# Patient Record
Sex: FEMALE | Race: WHITE | NOT HISPANIC OR LATINO | Employment: OTHER | URBAN - METROPOLITAN AREA
[De-identification: names, ages, dates, MRNs, and addresses within clinical notes are randomized per-mention and may not be internally consistent; named-entity substitution may affect disease eponyms.]

---

## 2023-08-31 LAB
IGA (MG/DL) IN SER/PLAS: 78 MG/DL (ref 70–400)
IGG (MG/DL) IN SER/PLAS: 630 MG/DL (ref 700–1600)
IGM (MG/DL) IN SER/PLAS: 68 MG/DL (ref 40–230)

## 2023-09-06 LAB
PNEUMO SEROTYPE INTERPRETATION: NORMAL
SEROTYPE 1, VIRC: 4.74 UG/ML
SEROTYPE 10A(34), VIRC: 3.56 UG/ML
SEROTYPE 11A(43), VIRC: 2.43 UG/ML
SEROTYPE 12F, VIRC: 7.48 UG/ML
SEROTYPE 14, VIRC: 0.86 UG/ML
SEROTYPE 15B(54), VIRC: 5.03 UG/ML
SEROTYPE 17F, VIRC: 6.43 UG/ML
SEROTYPE 18C(56), VIRC: 4.43 UG/ML
SEROTYPE 19A(57), VIRC: 20.64 UG/ML
SEROTYPE 19F, VIRC: 9.77 UG/ML
SEROTYPE 2, VIRC: 2.85 UG/ML
SEROTYPE 20, VIRC: 0.96 UG/ML
SEROTYPE 22F, VIRC: 2.35 UG/ML
SEROTYPE 23F, VIRC: 0.46 UG/ML
SEROTYPE 3, VIRC: 0.98 UG/ML
SEROTYPE 33F(70), VIRC: 3.28 UG/ML
SEROTYPE 4, VIRC: 3.69 UG/ML
SEROTYPE 5, VIRC: 6.18 UG/ML
SEROTYPE 6B(26), VIRC: 1.89 UG/ML
SEROTYPE 7F(51), VIRC: 0.66 UG/ML
SEROTYPE 8, VIRC: 0.84 UG/ML
SEROTYPE 9N, VIRC: 5.3 UG/ML
SEROTYPE 9V(68), VIRC: 4.36 UG/ML

## 2023-11-06 DIAGNOSIS — J01.91 ACUTE RECURRENT SINUSITIS, UNSPECIFIED LOCATION: Primary | ICD-10-CM

## 2023-11-06 RX ORDER — CEFDINIR 300 MG/1
300 CAPSULE ORAL 2 TIMES DAILY
Qty: 20 CAPSULE | Refills: 0 | Status: SHIPPED | OUTPATIENT
Start: 2023-11-06 | End: 2023-11-16

## 2023-11-06 NOTE — PROGRESS NOTES
Julianna reached out today at  7:46?AM   She wrote:   Symptoms xs 2 1/2 days:  horrible headache/nausea/dizziness/congestion.  No SOB or fever.  Negative results for Covid and RSV on 11/5 at [facility]. This sure feels like my c/o sinus. Nothing prescribed.  Thanks, Julianna Meadows  [birth year]    I had replied:   understood  I'll send an antibiotic prescription  which pharmacy would you like  ?    Julianna Meadows :  [facility] [place].  Thanks, Julianna Medina MD :  Let me know if you are not feeling better in 3-5 days.  Also, pause the amoxicillin prophylaxis until the acute antibiotic is completed.       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 Given the normal RSV and COVID testing, I am going to treat her for an acute sinusitis.

## 2024-01-23 DIAGNOSIS — U07.1 COVID: Primary | ICD-10-CM

## 2024-01-23 RX ORDER — NIRMATRELVIR AND RITONAVIR 300-100 MG
3 KIT ORAL 2 TIMES DAILY
Qty: 30 TABLET | Refills: 0 | Status: SHIPPED | OUTPATIENT
Start: 2024-01-23 | End: 2024-01-28

## 2024-01-23 NOTE — PROGRESS NOTES
01/23/24 2:54 PM    Julianna Meadows   I'm pretty positive I've developed a sinus infection.  Headache + stuffiness since sometime Fri.  Since yesterday I'm very dizzy and nauseous. My headache and congestion much worse.  I do not have fever or SOB.  Using my nasal spray and manage w tonic for nausea.  Thanks, Julianna Meadows([phone])    Alexandra Medina MD    11:27?AM   do you have a covid test at home?    Julianna   Doing it now    Alexandra Medina MD   Great     Julianna Meadows   I have Covid     Alexnadra Medina MD   Understood.  I am going to prescribe you Paxlovid to treat COVID.  However, I need to make sure that it does not adverse intereact with your other medicines.   Please, send me a list of all the meds you are on.  If you start having difficutly breathing, I am going to recommend a trip to emergency room.  I'll call in the Paxlovid Rx once I confirm it's OK for you to take it.    Julianna Meadows   Pravastatin 20mg/1xqd/asa 81mg/triamteren-E-HCTZ 37.5-25 MG CP/amoxicillin 500mg 2xs daily    Alexandra Medina MD   Got it.  Checking    1:31?PM     Julianna Meadows   I will not take any of my OTC meds!   2:51?PM     Alexandra Medina MD   looks like there is no major interactions  I'll send the Rx soon

## 2024-01-24 ENCOUNTER — APPOINTMENT (OUTPATIENT)
Dept: SURGERY | Facility: HOSPITAL | Age: 84
End: 2024-01-24
Payer: MEDICARE

## 2024-01-24 ENCOUNTER — APPOINTMENT (OUTPATIENT)
Dept: RADIOLOGY | Facility: HOSPITAL | Age: 84
End: 2024-01-24
Payer: MEDICARE

## 2024-01-31 ENCOUNTER — HOSPITAL ENCOUNTER (OUTPATIENT)
Dept: RADIOLOGY | Facility: CLINIC | Age: 84
Discharge: HOME | End: 2024-01-31
Payer: MEDICARE

## 2024-01-31 ENCOUNTER — APPOINTMENT (OUTPATIENT)
Dept: RADIOLOGY | Facility: HOSPITAL | Age: 84
End: 2024-01-31
Payer: MEDICARE

## 2024-01-31 DIAGNOSIS — C34.90 MALIGNANT NEOPLASM OF UNSPECIFIED PART OF UNSPECIFIED BRONCHUS OR LUNG (MULTI): ICD-10-CM

## 2024-01-31 PROCEDURE — 71250 CT THORAX DX C-: CPT

## 2024-01-31 PROCEDURE — 71250 CT THORAX DX C-: CPT | Performed by: RADIOLOGY

## 2024-02-07 ENCOUNTER — OFFICE VISIT (OUTPATIENT)
Dept: SURGERY | Facility: HOSPITAL | Age: 84
End: 2024-02-07
Payer: MEDICARE

## 2024-02-07 VITALS
HEART RATE: 74 BPM | TEMPERATURE: 97.3 F | BODY MASS INDEX: 24.17 KG/M2 | WEIGHT: 136.4 LBS | RESPIRATION RATE: 18 BRPM | OXYGEN SATURATION: 96 % | DIASTOLIC BLOOD PRESSURE: 66 MMHG | HEIGHT: 63 IN | SYSTOLIC BLOOD PRESSURE: 127 MMHG

## 2024-02-07 DIAGNOSIS — C34.12 MALIGNANT NEOPLASM OF UPPER LOBE OF LEFT LUNG (MULTI): ICD-10-CM

## 2024-02-07 PROCEDURE — 99214 OFFICE O/P EST MOD 30 MIN: CPT | Performed by: THORACIC SURGERY (CARDIOTHORACIC VASCULAR SURGERY)

## 2024-02-07 PROCEDURE — 1125F AMNT PAIN NOTED PAIN PRSNT: CPT | Performed by: THORACIC SURGERY (CARDIOTHORACIC VASCULAR SURGERY)

## 2024-02-07 PROCEDURE — 1159F MED LIST DOCD IN RCRD: CPT | Performed by: THORACIC SURGERY (CARDIOTHORACIC VASCULAR SURGERY)

## 2024-02-07 PROCEDURE — 1036F TOBACCO NON-USER: CPT | Performed by: THORACIC SURGERY (CARDIOTHORACIC VASCULAR SURGERY)

## 2024-02-07 PROCEDURE — 1157F ADVNC CARE PLAN IN RCRD: CPT | Performed by: THORACIC SURGERY (CARDIOTHORACIC VASCULAR SURGERY)

## 2024-02-07 RX ORDER — PRAVASTATIN SODIUM 20 MG/1
20 TABLET ORAL DAILY
COMMUNITY

## 2024-02-07 RX ORDER — MAGNESIUM 200 MG
TABLET ORAL
COMMUNITY

## 2024-02-07 RX ORDER — ASPIRIN 81 MG/1
81 TABLET ORAL DAILY
COMMUNITY

## 2024-02-07 RX ORDER — TRIAMTERENE/HYDROCHLOROTHIAZID 37.5-25 MG
1 TABLET ORAL DAILY
COMMUNITY

## 2024-02-07 ASSESSMENT — PATIENT HEALTH QUESTIONNAIRE - PHQ9
SUM OF ALL RESPONSES TO PHQ9 QUESTIONS 1 AND 2: 0
2. FEELING DOWN, DEPRESSED OR HOPELESS: NOT AT ALL
1. LITTLE INTEREST OR PLEASURE IN DOING THINGS: NOT AT ALL

## 2024-02-07 ASSESSMENT — COLUMBIA-SUICIDE SEVERITY RATING SCALE - C-SSRS
2. HAVE YOU ACTUALLY HAD ANY THOUGHTS OF KILLING YOURSELF?: NO
6. HAVE YOU EVER DONE ANYTHING, STARTED TO DO ANYTHING, OR PREPARED TO DO ANYTHING TO END YOUR LIFE?: NO
1. IN THE PAST MONTH, HAVE YOU WISHED YOU WERE DEAD OR WISHED YOU COULD GO TO SLEEP AND NOT WAKE UP?: NO

## 2024-02-07 ASSESSMENT — ENCOUNTER SYMPTOMS
LOSS OF SENSATION IN FEET: 0
OCCASIONAL FEELINGS OF UNSTEADINESS: 0
DEPRESSION: 0

## 2024-02-07 ASSESSMENT — PAIN SCALES - GENERAL: PAINLEVEL: 3

## 2024-02-07 NOTE — PROGRESS NOTES
Mrs. 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 She has COPD treated with inhalers. Since the last visit there have been no changes to the past medical history, past surgical history, social history, family history, or review of systems.     I looked at her CAT scan from January 31, 2024 and compared to the prior.  There is no evidence of recurrence.    Meera is doing well and is without evidence of recurrence.  I will see her back in 6 months with a CAT scan.

## 2024-05-15 ENCOUNTER — HOSPITAL ENCOUNTER (OUTPATIENT)
Dept: RADIOLOGY | Facility: CLINIC | Age: 84
Discharge: HOME | End: 2024-05-15
Payer: MEDICARE

## 2024-05-15 ENCOUNTER — OFFICE VISIT (OUTPATIENT)
Dept: ORTHOPEDIC SURGERY | Facility: CLINIC | Age: 84
End: 2024-05-15
Payer: MEDICARE

## 2024-05-15 VITALS — WEIGHT: 136 LBS | HEIGHT: 62 IN | BODY MASS INDEX: 25.03 KG/M2

## 2024-05-15 DIAGNOSIS — M25.551 RIGHT HIP PAIN: ICD-10-CM

## 2024-05-15 DIAGNOSIS — M70.61 TROCHANTERIC BURSITIS OF RIGHT HIP: ICD-10-CM

## 2024-05-15 DIAGNOSIS — M25.551 RIGHT HIP PAIN: Primary | ICD-10-CM

## 2024-05-15 PROCEDURE — 1159F MED LIST DOCD IN RCRD: CPT | Performed by: ORTHOPAEDIC SURGERY

## 2024-05-15 PROCEDURE — 99213 OFFICE O/P EST LOW 20 MIN: CPT | Performed by: ORTHOPAEDIC SURGERY

## 2024-05-15 PROCEDURE — 73502 X-RAY EXAM HIP UNI 2-3 VIEWS: CPT | Mod: RT

## 2024-05-15 PROCEDURE — 1157F ADVNC CARE PLAN IN RCRD: CPT | Performed by: ORTHOPAEDIC SURGERY

## 2024-05-15 PROCEDURE — 1125F AMNT PAIN NOTED PAIN PRSNT: CPT | Performed by: ORTHOPAEDIC SURGERY

## 2024-05-15 PROCEDURE — 73502 X-RAY EXAM HIP UNI 2-3 VIEWS: CPT | Mod: RIGHT SIDE | Performed by: RADIOLOGY

## 2024-05-15 ASSESSMENT — PAIN DESCRIPTION - DESCRIPTORS: DESCRIPTORS: DISCOMFORT;THROBBING

## 2024-05-15 ASSESSMENT — PAIN SCALES - GENERAL: PAINLEVEL_OUTOF10: 2

## 2024-05-15 ASSESSMENT — PAIN - FUNCTIONAL ASSESSMENT: PAIN_FUNCTIONAL_ASSESSMENT: 0-10

## 2024-06-07 ENCOUNTER — DOCUMENTATION (OUTPATIENT)
Dept: ALLERGY | Facility: CLINIC | Age: 84
End: 2024-06-07
Payer: MEDICARE

## 2024-06-07 DIAGNOSIS — J01.91 ACUTE RECURRENT SINUSITIS, UNSPECIFIED LOCATION: ICD-10-CM

## 2024-06-07 DIAGNOSIS — J01.91 ACUTE RECURRENT SINUSITIS, UNSPECIFIED LOCATION: Primary | ICD-10-CM

## 2024-06-07 RX ORDER — CEFDINIR 300 MG/1
300 CAPSULE ORAL 2 TIMES DAILY
Qty: 20 CAPSULE | Refills: 0 | Status: SHIPPED | OUTPATIENT
Start: 2024-06-07 | End: 2024-06-07 | Stop reason: SDUPTHER

## 2024-06-07 RX ORDER — CEFDINIR 300 MG/1
300 CAPSULE ORAL 2 TIMES DAILY
Qty: 20 CAPSULE | Refills: 0 | Status: SHIPPED | OUTPATIENT
Start: 2024-06-07 | End: 2024-06-17

## 2024-06-07 NOTE — PROGRESS NOTES
"Julianna reached out to me today::    \"Hi!  I tested negative for Covid.  Have had: congestion w huge headache/some nausea. Dizziness also. No fever.  Been feeling symptoms about 4 days.  Thanks, Julianna Meadows/ [birth year]\"      "

## 2024-06-08 DIAGNOSIS — J32.9 SINUSITIS, UNSPECIFIED CHRONICITY, UNSPECIFIED LOCATION: Primary | ICD-10-CM

## 2024-06-17 RX ORDER — AMOXICILLIN 500 MG/1
500 TABLET, FILM COATED ORAL EVERY 12 HOURS
Qty: 60 TABLET | Refills: 5 | Status: SHIPPED | OUTPATIENT
Start: 2024-06-17

## 2024-06-24 NOTE — PROGRESS NOTES
Patient presents today for right laterally based hip pain.  She denies any groin pain.  It comes and goes.  She is never had surgery in the hip.    Right hip:  AAOx3, NAD, walks with a non-antalgic gait  No pain with flexion internal rotation  Negative Stinchfield  Mild TTP over trochanter laterally  5/5 Hip flexion/knee extension/df/pf/ehl  SILT in a chriss/saph/per/tib distribution  ½ dorsalis pedis and posterior tibial pulse  no popliteal or inguinal lymphadenopathy  no other overlying lesions  mood: euthymic  Respirations non labored  Incision healed    Plain films were reviewed by myself in clinic today.  She has well-maintained joint space with no signs of intra-articular pathology.    Patient with mildly symptomatic trochanteric bursitis.  We discussed conservative treatment including anti-inflammatories, Tylenol and possible injections.  She would like to see how it does over time.  All of her questions were answered.  She will follow-up with us as needed.    This note was created using voice recognition software and was not corrected for typographical or grammatical errors.

## 2024-08-07 ENCOUNTER — APPOINTMENT (OUTPATIENT)
Dept: SURGERY | Facility: HOSPITAL | Age: 84
End: 2024-08-07
Payer: MEDICARE

## 2024-08-07 ENCOUNTER — APPOINTMENT (OUTPATIENT)
Dept: RADIOLOGY | Facility: CLINIC | Age: 84
End: 2024-08-07
Payer: MEDICARE

## 2024-08-14 ENCOUNTER — OFFICE VISIT (OUTPATIENT)
Dept: SURGERY | Facility: HOSPITAL | Age: 84
End: 2024-08-14
Payer: MEDICARE

## 2024-08-14 ENCOUNTER — HOSPITAL ENCOUNTER (OUTPATIENT)
Dept: RADIOLOGY | Facility: HOSPITAL | Age: 84
Discharge: HOME | End: 2024-08-14
Payer: MEDICARE

## 2024-08-14 VITALS
OXYGEN SATURATION: 97 % | RESPIRATION RATE: 18 BRPM | WEIGHT: 137.79 LBS | DIASTOLIC BLOOD PRESSURE: 63 MMHG | HEART RATE: 70 BPM | TEMPERATURE: 97.5 F | SYSTOLIC BLOOD PRESSURE: 122 MMHG | BODY MASS INDEX: 25.2 KG/M2

## 2024-08-14 DIAGNOSIS — C34.90 MALIGNANT NEOPLASM OF LUNG, UNSPECIFIED LATERALITY, UNSPECIFIED PART OF LUNG (MULTI): ICD-10-CM

## 2024-08-14 DIAGNOSIS — C34.12 MALIGNANT NEOPLASM OF UPPER LOBE OF LEFT LUNG (MULTI): ICD-10-CM

## 2024-08-14 PROCEDURE — 71250 CT THORAX DX C-: CPT | Performed by: RADIOLOGY

## 2024-08-14 PROCEDURE — 1157F ADVNC CARE PLAN IN RCRD: CPT | Performed by: THORACIC SURGERY (CARDIOTHORACIC VASCULAR SURGERY)

## 2024-08-14 PROCEDURE — 1159F MED LIST DOCD IN RCRD: CPT | Performed by: THORACIC SURGERY (CARDIOTHORACIC VASCULAR SURGERY)

## 2024-08-14 PROCEDURE — 71250 CT THORAX DX C-: CPT

## 2024-08-14 PROCEDURE — 99214 OFFICE O/P EST MOD 30 MIN: CPT | Performed by: THORACIC SURGERY (CARDIOTHORACIC VASCULAR SURGERY)

## 2024-08-14 PROCEDURE — 1126F AMNT PAIN NOTED NONE PRSNT: CPT | Performed by: THORACIC SURGERY (CARDIOTHORACIC VASCULAR SURGERY)

## 2024-08-14 ASSESSMENT — PAIN SCALES - GENERAL: PAINLEVEL: 0-NO PAIN

## 2024-08-14 NOTE — PROGRESS NOTES
Mrs. Meadows underwent a left VATS superior segmentectomy on 7/5/22 for a 1.8cm adenocarcinoma stage [id number] with all margins negative. She has COPD treated with inhalers. Since the last visit there have been no changes to the past medical history, past surgical history, social history, family history, or review of systems.     I viewed the current CT scan and compared it to the prior.  There are no new lung nodules or adenopathy.  There is no evidence of recurrence.  Mrs. García is doing well.  We will check another CAT scan in 1 year.

## 2024-08-30 DIAGNOSIS — D80.1 HYPOGAMMAGLOBULINEMIA (MULTI): Primary | ICD-10-CM

## 2024-08-30 NOTE — PROGRESS NOTES
Julianna:   8:40?AM   Hi! Do I need blood work before I restart Amoxicillin bid.  Normally I start again in Sept.  Julianna Meadows(1940)  [phone]    Alexandra Medina MD    12:27?PM   Yes, I'd recommend to recheck the blood work again - you may start Amoxicillin before the blood work is done, however.  No need to fast for these blood tests either.   Please, make a follow up visit in a month or two for a chec up.  The blood test orders are in the  system - so you could go to any  affiliated lab to have them drawn.  Do you need any refills for Amoxicillin?

## 2024-09-03 ENCOUNTER — LAB (OUTPATIENT)
Dept: LAB | Facility: LAB | Age: 84
End: 2024-09-03
Payer: MEDICARE

## 2024-09-03 DIAGNOSIS — D80.1 HYPOGAMMAGLOBULINEMIA (MULTI): ICD-10-CM

## 2024-09-03 PROCEDURE — 82784 ASSAY IGA/IGD/IGG/IGM EACH: CPT

## 2024-09-03 PROCEDURE — 36415 COLL VENOUS BLD VENIPUNCTURE: CPT

## 2024-09-04 LAB
IGA SERPL-MCNC: 110 MG/DL (ref 70–400)
IGG SERPL-MCNC: 777 MG/DL (ref 700–1600)
IGM SERPL-MCNC: 72 MG/DL (ref 40–230)

## 2024-11-04 ENCOUNTER — APPOINTMENT (OUTPATIENT)
Dept: ALLERGY | Facility: CLINIC | Age: 84
End: 2024-11-04
Payer: MEDICARE

## 2024-11-04 VITALS — OXYGEN SATURATION: 96 % | WEIGHT: 141.1 LBS | TEMPERATURE: 97.5 F | BODY MASS INDEX: 25.81 KG/M2 | HEART RATE: 69 BPM

## 2024-11-04 DIAGNOSIS — D80.1 HYPOGAMMAGLOBULINEMIA (MULTI): Primary | ICD-10-CM

## 2024-11-04 PROCEDURE — 99214 OFFICE O/P EST MOD 30 MIN: CPT | Performed by: PEDIATRICS

## 2024-11-04 PROCEDURE — 1157F ADVNC CARE PLAN IN RCRD: CPT | Performed by: PEDIATRICS

## 2024-11-04 ASSESSMENT — ENCOUNTER SYMPTOMS
PALPITATIONS: 0
VOMITING: 0
ABDOMINAL PAIN: 0
JOINT SWELLING: 0
COUGH: 0
ADENOPATHY: 0
FACIAL SWELLING: 0
BLOOD IN STOOL: 0
EYE DISCHARGE: 0
FEVER: 0
EYE ITCHING: 0
RHINORRHEA: 0
NAUSEA: 0
UNEXPECTED WEIGHT CHANGE: 0
DIARRHEA: 0

## 2024-11-04 NOTE — PROGRESS NOTES
Patient ID: Julianna Meadows is a 84 y.o. female who [facility] for a follow up visit.    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    She has restarted amoxicillin prophylaxis in September.     Immune prophylaxis: She takes amoxicillin 500 mg BID in the fall and winter season  Side effects: n/a     Recent Results (from the past 20 weeks)   IgA    Collection Time: 09/03/24 11:57 AM   Result Value Ref Range    IgA 110 70 - 400 mg/dL   IgG    Collection Time: 09/03/24 11:57 AM   Result Value Ref Range    IgG 777 700 - 1,600 mg/dL   IgM    Collection Time: 09/03/24 11:57 AM   Result Value Ref Range    IgM 72 40 - 230 mg/dL       IgG is now normal.  This is great--a big improvement compared to last year.    Review of Systems   Constitutional:  Negative for fever and unexpected weight change (no poor weight gain).   HENT:  Negative for ear pain, facial swelling, postnasal drip, rhinorrhea and sneezing.    Eyes:  Negative for discharge and itching.   Respiratory:  Negative for cough.    Cardiovascular:  Negative for chest pain and palpitations.   Gastrointestinal:  Negative for abdominal pain, blood in stool, diarrhea, nausea and vomiting ((no dysphagia)).   Musculoskeletal:  Negative for joint swelling.        Hip pain - needs a hip replacement surgery, potentially.   Skin:  Negative for rash.   Neurological:  Negative for syncope.   Hematological:  Negative for adenopathy.   All other systems reviewed and are negative.       Visit Vitals  Pulse 69   Temp 36.4 °C (97.5 °F) (Oral)   Wt 64 kg (141 lb 1.6 oz)   SpO2 96% Comment: RA   BMI 25.81 kg/m²   Smoking Status Former   BSA 1.67 m²        CONSTITUTIONAL: Well developed, well nourished, no acute distress.   HEAD: Normocephalic, no dysmorphic features.   EYES: No Dennie Josh lines; no allergic shiners. Conjunctiva and  "sclerae are not injected.   EARS: Tympanic Membranes have normal landmarks without erythema   NOSE: the nasal mucosa is pink, nasal passages are patent, there is no discharge seen. No nasal polyps.  THROAT:  no oral lesion(s).   NECK: Normal, supple, symmetric, trachea midline.  LYMPH: No cervical lymphadenopathy or masses noted.    CARDIOVASCULAR: Regular rate, no murmur.    PULMONARY: Comfortable breathing pattern, no distress, normal aeration, clear to auscultation and no wheezing.   ABDOMEN: Soft non-tender, non-distended.   MUSCULOSKELETAL: no clubbing, cyanosis, or edema  SKIN:  no xerosis; no rash     Impressions:  -- Hypogammaglobulinemia with preserved pneumococcal responses.  In 2024 IgG level is 777 mg/dL.  IgM and IgA were normal as well.    -- History of recurrent sinusitis     (-- Chronic headaches- with history of a brain aneurysm)     Plan:   She had responded well to amoxicillin prophylaxis in the past. Let's continue amoxicillin prophylaxis (500 mg of AMOXICILLIN PO BID) in the upcoming fall and winter season (until April) -not withstanding her normal serum IgG levels.  In the past, her IgG levels have fluctuated as well between being normal and \"hypo\".  To be safe, lets just stay on the prophylaxis until April to.     Use Flonase as well at the first sign of a cold.     [facility]: In 12 months  "

## 2024-12-01 DIAGNOSIS — J32.9 SINUSITIS, UNSPECIFIED CHRONICITY, UNSPECIFIED LOCATION: ICD-10-CM

## 2024-12-03 RX ORDER — AMOXICILLIN 500 MG/1
500 TABLET, FILM COATED ORAL EVERY 12 HOURS
Qty: 60 TABLET | Refills: 5 | Status: SHIPPED | OUTPATIENT
Start: 2024-12-03

## 2025-03-12 ENCOUNTER — DOCUMENTATION (OUTPATIENT)
Dept: ALLERGY | Facility: CLINIC | Age: 85
End: 2025-03-12
Payer: MEDICARE

## 2025-03-12 DIAGNOSIS — J01.91 ACUTE RECURRENT SINUSITIS, UNSPECIFIED LOCATION: ICD-10-CM

## 2025-03-12 RX ORDER — DOXYCYCLINE 100 MG/1
100 CAPSULE ORAL 2 TIMES DAILY
Qty: 20 CAPSULE | Refills: 0 | Status: SHIPPED | OUTPATIENT
Start: 2025-03-12 | End: 2025-03-22

## 2025-03-12 NOTE — PROGRESS NOTES
Julianna texted:  I’m pretty sure I have sinus infection: slight dizziness and nausea.  Headache and stuffy/tight congestion.  No fever.  Earlier tested negative for Covid.  Julianna Meadows(1940)  Thanks    Duration of symptoms : 3 days    I: acute sinusitis   R: doxy x 10 days

## 2025-08-13 ENCOUNTER — HOSPITAL ENCOUNTER (OUTPATIENT)
Dept: RADIOLOGY | Facility: HOSPITAL | Age: 85
Discharge: HOME | End: 2025-08-13
Payer: MEDICARE

## 2025-08-13 ENCOUNTER — OFFICE VISIT (OUTPATIENT)
Dept: SURGERY | Facility: HOSPITAL | Age: 85
End: 2025-08-13
Payer: MEDICARE

## 2025-08-13 VITALS
DIASTOLIC BLOOD PRESSURE: 77 MMHG | WEIGHT: 136.2 LBS | RESPIRATION RATE: 16 BRPM | HEART RATE: 61 BPM | TEMPERATURE: 96.4 F | SYSTOLIC BLOOD PRESSURE: 149 MMHG | BODY MASS INDEX: 24.91 KG/M2 | OXYGEN SATURATION: 97 %

## 2025-08-13 DIAGNOSIS — C34.90 MALIGNANT NEOPLASM OF LUNG, UNSPECIFIED LATERALITY, UNSPECIFIED PART OF LUNG (MULTI): ICD-10-CM

## 2025-08-13 PROCEDURE — 1157F ADVNC CARE PLAN IN RCRD: CPT | Performed by: THORACIC SURGERY (CARDIOTHORACIC VASCULAR SURGERY)

## 2025-08-13 PROCEDURE — 71250 CT THORAX DX C-: CPT | Performed by: STUDENT IN AN ORGANIZED HEALTH CARE EDUCATION/TRAINING PROGRAM

## 2025-08-13 PROCEDURE — 71250 CT THORAX DX C-: CPT

## 2025-08-13 PROCEDURE — 99214 OFFICE O/P EST MOD 30 MIN: CPT | Performed by: THORACIC SURGERY (CARDIOTHORACIC VASCULAR SURGERY)

## 2025-08-13 PROCEDURE — 1126F AMNT PAIN NOTED NONE PRSNT: CPT | Performed by: THORACIC SURGERY (CARDIOTHORACIC VASCULAR SURGERY)

## 2025-08-13 PROCEDURE — 99214 OFFICE O/P EST MOD 30 MIN: CPT | Mod: 25 | Performed by: THORACIC SURGERY (CARDIOTHORACIC VASCULAR SURGERY)

## 2025-08-13 ASSESSMENT — PAIN SCALES - GENERAL: PAINLEVEL_OUTOF10: 0-NO PAIN

## 2025-09-04 ENCOUNTER — TELEPHONE (OUTPATIENT)
Dept: CARDIOTHORACIC SURGERY | Facility: HOSPITAL | Age: 85
End: 2025-09-04
Payer: MEDICARE

## 2026-08-12 ENCOUNTER — APPOINTMENT (OUTPATIENT)
Dept: SURGERY | Facility: HOSPITAL | Age: 86
End: 2026-08-12
Payer: MEDICARE

## 2026-08-12 ENCOUNTER — APPOINTMENT (OUTPATIENT)
Dept: RADIOLOGY | Facility: HOSPITAL | Age: 86
End: 2026-08-12
Payer: MEDICARE